# Patient Record
Sex: MALE | Race: WHITE | NOT HISPANIC OR LATINO | Employment: FULL TIME | URBAN - METROPOLITAN AREA
[De-identification: names, ages, dates, MRNs, and addresses within clinical notes are randomized per-mention and may not be internally consistent; named-entity substitution may affect disease eponyms.]

---

## 2017-11-30 ENCOUNTER — OFFICE VISIT (OUTPATIENT)
Dept: PRIMARY CARE CLINIC | Facility: CLINIC | Age: 47
End: 2017-11-30
Payer: COMMERCIAL

## 2017-11-30 VITALS
HEART RATE: 81 BPM | BODY MASS INDEX: 27 KG/M2 | SYSTOLIC BLOOD PRESSURE: 142 MMHG | RESPIRATION RATE: 18 BRPM | WEIGHT: 172 LBS | DIASTOLIC BLOOD PRESSURE: 91 MMHG | OXYGEN SATURATION: 100 % | TEMPERATURE: 99 F | HEIGHT: 67 IN

## 2017-11-30 DIAGNOSIS — L57.0 ACTINIC KERATOSIS: ICD-10-CM

## 2017-11-30 DIAGNOSIS — Z72.0 TOBACCO ABUSE: ICD-10-CM

## 2017-11-30 DIAGNOSIS — J45.909 ASTHMA, UNSPECIFIED ASTHMA SEVERITY, UNSPECIFIED WHETHER COMPLICATED, UNSPECIFIED WHETHER PERSISTENT: Primary | ICD-10-CM

## 2017-11-30 DIAGNOSIS — F98.8 ATTENTION DEFICIT DISORDER, UNSPECIFIED HYPERACTIVITY PRESENCE: ICD-10-CM

## 2017-11-30 DIAGNOSIS — M51.36 DDD (DEGENERATIVE DISC DISEASE), LUMBAR: ICD-10-CM

## 2017-11-30 PROCEDURE — 99999 PR PBB SHADOW E&M-NEW PATIENT-LVL III: CPT | Mod: PBBFAC,,, | Performed by: FAMILY MEDICINE

## 2017-11-30 PROCEDURE — 99204 OFFICE O/P NEW MOD 45 MIN: CPT | Mod: S$PBB,,, | Performed by: FAMILY MEDICINE

## 2017-11-30 RX ORDER — DEXTROAMPHETAMINE SACCHARATE, AMPHETAMINE ASPARTATE MONOHYDRATE, DEXTROAMPHETAMINE SULFATE AND AMPHETAMINE SULFATE 7.5; 7.5; 7.5; 7.5 MG/1; MG/1; MG/1; MG/1
30 CAPSULE, EXTENDED RELEASE ORAL 2 TIMES DAILY
COMMUNITY

## 2017-11-30 RX ORDER — ALBUTEROL SULFATE 90 UG/1
2 AEROSOL, METERED RESPIRATORY (INHALATION) EVERY 4 HOURS PRN
Qty: 3 INHALER | Refills: 5 | Status: SHIPPED | OUTPATIENT
Start: 2017-11-30

## 2017-11-30 RX ORDER — CYCLOBENZAPRINE HCL 10 MG
10 TABLET ORAL 2 TIMES DAILY
COMMUNITY
End: 2017-11-30 | Stop reason: SDUPTHER

## 2017-11-30 RX ORDER — CYCLOBENZAPRINE HCL 10 MG
10 TABLET ORAL 2 TIMES DAILY
Qty: 60 TABLET | Refills: 1 | Status: SHIPPED | OUTPATIENT
Start: 2017-11-30

## 2017-11-30 RX ORDER — MELOXICAM 15 MG/1
15 TABLET ORAL DAILY
Qty: 30 TABLET | Refills: 1 | Status: SHIPPED | OUTPATIENT
Start: 2017-11-30

## 2017-11-30 RX ORDER — MELOXICAM 15 MG/1
15 TABLET ORAL DAILY
COMMUNITY
End: 2017-11-30 | Stop reason: SDUPTHER

## 2017-11-30 NOTE — PATIENT INSTRUCTIONS
[facility] for evaluation for ADD or ADHD for Adderall treatment  Follow-up Dr. Bhardwaj for actinic keratosis  Okay meloxicam and Flexeril for DDD 5 C6-7 and become severe could see neurosurgery  Moist heat Thera-Gesic range of motion exercise could use Lidoderm patch if increased pain or could see neurosurgery for epidural steroid injection  Okay refill asthma but

## 2018-06-21 ENCOUNTER — OFFICE VISIT (OUTPATIENT)
Dept: PRIMARY CARE CLINIC | Facility: CLINIC | Age: 48
End: 2018-06-21
Payer: COMMERCIAL

## 2018-06-21 ENCOUNTER — CLINICAL SUPPORT (OUTPATIENT)
Dept: PRIMARY CARE CLINIC | Facility: CLINIC | Age: 48
End: 2018-06-21
Payer: COMMERCIAL

## 2018-06-21 VITALS
BODY MASS INDEX: 25.79 KG/M2 | HEIGHT: 67 IN | HEART RATE: 94 BPM | WEIGHT: 164.31 LBS | SYSTOLIC BLOOD PRESSURE: 133 MMHG | RESPIRATION RATE: 18 BRPM | OXYGEN SATURATION: 100 % | DIASTOLIC BLOOD PRESSURE: 80 MMHG | TEMPERATURE: 98 F

## 2018-06-21 DIAGNOSIS — R53.83 FATIGUE, UNSPECIFIED TYPE: ICD-10-CM

## 2018-06-21 DIAGNOSIS — F98.8 ATTENTION DEFICIT DISORDER, UNSPECIFIED HYPERACTIVITY PRESENCE: ICD-10-CM

## 2018-06-21 DIAGNOSIS — J45.909 ASTHMA, UNSPECIFIED ASTHMA SEVERITY, UNSPECIFIED WHETHER COMPLICATED, UNSPECIFIED WHETHER PERSISTENT: ICD-10-CM

## 2018-06-21 DIAGNOSIS — Z86.39 HISTORY OF HYPOGONADISM: ICD-10-CM

## 2018-06-21 DIAGNOSIS — R53.83 FATIGUE, UNSPECIFIED TYPE: Primary | ICD-10-CM

## 2018-06-21 DIAGNOSIS — M51.36 DDD (DEGENERATIVE DISC DISEASE), LUMBAR: ICD-10-CM

## 2018-06-21 DIAGNOSIS — L57.0 ACTINIC KERATOSIS: ICD-10-CM

## 2018-06-21 PROBLEM — Z72.0 TOBACCO ABUSE: Status: RESOLVED | Noted: 2017-11-30 | Resolved: 2018-06-21

## 2018-06-21 LAB
ALBUMIN SERPL BCP-MCNC: 4.2 G/DL
ALP SERPL-CCNC: 24 U/L
ALT SERPL W/O P-5'-P-CCNC: 53 U/L
ANION GAP SERPL CALC-SCNC: 6 MMOL/L
AST SERPL-CCNC: 57 U/L
BASOPHILS # BLD AUTO: 0.1 K/UL
BASOPHILS NFR BLD: 1.1 %
BILIRUB SERPL-MCNC: 1.2 MG/DL
BILIRUB SERPL-MCNC: NORMAL MG/DL
BLOOD URINE, POC: NORMAL
BUN SERPL-MCNC: 16 MG/DL
CALCIUM SERPL-MCNC: 9.3 MG/DL
CHLORIDE SERPL-SCNC: 102 MMOL/L
CHOLEST SERPL-MCNC: 160 MG/DL
CHOLEST/HDLC SERPL: 3 {RATIO}
CO2 SERPL-SCNC: 28 MMOL/L
COLOR, POC UA: YELLOW
CREAT SERPL-MCNC: 1.3 MG/DL
DIFFERENTIAL METHOD: ABNORMAL
EOSINOPHIL # BLD AUTO: 0.2 K/UL
EOSINOPHIL NFR BLD: 3.3 %
ERYTHROCYTE [DISTWIDTH] IN BLOOD BY AUTOMATED COUNT: 15.3 %
EST. GFR  (AFRICAN AMERICAN): >60 ML/MIN/1.73 M^2
EST. GFR  (NON AFRICAN AMERICAN): >60 ML/MIN/1.73 M^2
GLUCOSE SERPL-MCNC: 90 MG/DL
GLUCOSE UR QL STRIP: NORMAL
HCT VFR BLD AUTO: 49.1 %
HDLC SERPL-MCNC: 54 MG/DL
HDLC SERPL: 33.8 %
HGB BLD-MCNC: 16.1 G/DL
KETONES UR QL STRIP: NORMAL
LDLC SERPL CALC-MCNC: 89 MG/DL
LEUKOCYTE ESTERASE URINE, POC: NORMAL
LYMPHOCYTES # BLD AUTO: 0.5 K/UL
LYMPHOCYTES NFR BLD: 7.5 %
MCH RBC QN AUTO: 29.6 PG
MCHC RBC AUTO-ENTMCNC: 32.7 G/DL
MCV RBC AUTO: 91 FL
MONOCYTES # BLD AUTO: 0.4 K/UL
MONOCYTES NFR BLD: 5.6 %
NEUTROPHILS # BLD AUTO: 5.4 K/UL
NEUTROPHILS NFR BLD: 82.5 %
NITRITE, POC UA: NORMAL
NONHDLC SERPL-MCNC: 106 MG/DL
PH, POC UA: 5
PLATELET # BLD AUTO: 324 K/UL
PMV BLD AUTO: 7.8 FL
POTASSIUM SERPL-SCNC: 4.3 MMOL/L
PROT SERPL-MCNC: 7.4 G/DL
PROTEIN, POC: NORMAL
RBC # BLD AUTO: 5.43 M/UL
SODIUM SERPL-SCNC: 136 MMOL/L
SPECIFIC GRAVITY, POC UA: 1.01
T4 FREE SERPL-MCNC: 1.1 NG/DL
TRIGL SERPL-MCNC: 87 MG/DL
TSH SERPL DL<=0.005 MIU/L-ACNC: 1.35 UIU/ML
UROBILINOGEN, POC UA: NORMAL
WBC # BLD AUTO: 6.5 K/UL

## 2018-06-21 PROCEDURE — 99213 OFFICE O/P EST LOW 20 MIN: CPT | Mod: 25,S$GLB,, | Performed by: FAMILY MEDICINE

## 2018-06-21 PROCEDURE — 99999 PR PBB SHADOW E&M-EST. PATIENT-LVL IV: CPT | Mod: PBBFAC,,, | Performed by: FAMILY MEDICINE

## 2018-06-21 PROCEDURE — 81002 URINALYSIS NONAUTO W/O SCOPE: CPT | Mod: S$GLB,,, | Performed by: FAMILY MEDICINE

## 2018-06-21 PROCEDURE — 99999 PR PBB SHADOW E&M-EST. PATIENT-LVL II: CPT | Mod: PBBFAC,,,

## 2018-06-21 PROCEDURE — 84403 ASSAY OF TOTAL TESTOSTERONE: CPT

## 2018-06-21 NOTE — PROGRESS NOTES
Subjective:       Patient ID: Emery Pritchard is a 47 y.o. male.    Chief Complaint: Annual Exam (labs , check testosterone level)    HPI: 47-year-old white male in for routine labs had a history of hypogonadism with a low testosterone several years ago--states was having problems with fatigue urinary problems did testosterone -- low levels.  Dr.Jennifer Hutchison--NP.  Patient stopped using her--started doing labs on line with testosterone and estrogen, and was ordering is on testosterone.  Patient ordering up from the [place] lab.  Has not done  labs in a few months time to have a general physical done so wanted routine labs.Testosterone 75 mg twice a week Monday and Thursdays try and keep levels.       Patient complaining of snoring    ROS:  Skin: no psoriasis, eczema, +skin cancer--has had skin cancer removed from neck has several lesions frozen  HEENT: No headache, ocular pain, blurred vision, diplopia, epistaxis, hoarseness change in voice, thyroid trouble  Lung: No pneumonia, asthma, Tb, wheezing, SOB, no smoking--history of childhood asthma has albuterol inhaler but uses it rarely  Heart: No chest pain, ankle edema, palpitations, MI, beronica murmur, hypertension, hyperlipidemia  Abdomen: No nausea, vomiting, diarrhea, constipation, ulcers, hepatitis, gallbladder disease, melena, hematochezia, hematemesis  : no UTI, renal disease, stones  MS: no fractures, O/A, lupus, rheumatoid, gout history DDD lumbar spine and neck   q1  Neuro: No dizziness, LOC, seizures   No diabetes, no anemia, no anxiety, no depression + ADD Dr Omero Ramos in [place]    Objective:   Physical Exam:  General: Well nourished, well developed, no acute distress  Skin: No lesions  HEENT: Eyes PERRLA, EOM intact, nose patent, throat non-erythematous ears TM clear  NECK: Supple, no bruits, No JVD, no nodes  Lungs: Clear, no rales, rhonchi, wheezing  Heart: Regular rate and rhythm, no murmurs, gallops, or rubs  Abdomen: flat, bowel  sounds positive, no tenderness, or organomegaly  MS: Range of motion and muscle strength intact  Neuro: Alert, CN intact, oriented X 3  Extremities: No cyanosis, clubbing, or edema         Assessment:       1. Fatigue, unspecified type    2. History of hypogonadism    3. Attention deficit disorder, unspecified hyperactivity presence    4. Actinic keratosis    5. Asthma, unspecified asthma severity, unspecified whether complicated, unspecified whether persistent    6. DDD (degenerative disc disease), lumbar        Plan:       Fatigue, unspecified type  -     CBC auto differential; Future; Expected date: 06/21/2018  -     Comprehensive metabolic panel; Future; Expected date: 06/21/2018  -     Lipid panel; Future; Expected date: 06/21/2018  -     POCT EKG 12-LEAD (NOT FOR [facility] USE)  -     POCT occult blood stool  -     X-Ray Chest PA And Lateral; Future; Expected date: 06/21/2018  -     POCT urine dipstick without microscope  -     T4, free; Future; Expected date: 06/21/2018  -     TSH; Future; Expected date: 06/21/2018  -     Testosterone, free; Future; Expected date: 06/21/2018  -     Testosterone; Future; Expected date: 06/21/2018    History of hypogonadism  -     Ambulatory Referral to Urology    Attention deficit disorder, unspecified hyperactivity presence    Actinic keratosis  -     CBC auto differential; Future; Expected date: 06/21/2018  -     Comprehensive metabolic panel; Future; Expected date: 06/21/2018  -     Lipid panel; Future; Expected date: 06/21/2018  -     POCT EKG 12-LEAD (NOT FOR [facility] USE)  -     POCT occult blood stool  -     X-Ray Chest PA And Lateral; Future; Expected date: 06/21/2018  -     POCT urine dipstick without microscope  -     T4, free; Future; Expected date: 06/21/2018  -     TSH; Future; Expected date: 06/21/2018  -     Testosterone, free; Future; Expected date: 06/21/2018  -     Testosterone; Future; Expected date: 06/21/2018    Asthma, unspecified asthma severity,  unspecified whether complicated, unspecified whether persistent    DDD (degenerative disc disease), lumbar      routine labs ordered CBC CMP lipid T4 TSH UA chest x-ray EKG serum testosterone free and total  Ambulatory referral to Dr. Terry LOUIS M.D. for follow-up of testosterone hypergonadism--patient is been checking labs and buying testosterone from [facility]--states getting emotionally labile--told best to see Dr. Elizabeth he works with testosterone may be able to have a different type of solution or preparation that may be absorbed better and maintain levels in a more constant level  I do not write testosterone product  Keep appointment with Dr. Omero Ramos for ADD medication

## 2018-06-22 LAB — TESTOST SERPL-MCNC: 149 NG/DL
